# Patient Record
Sex: FEMALE | Race: AMERICAN INDIAN OR ALASKA NATIVE
[De-identification: names, ages, dates, MRNs, and addresses within clinical notes are randomized per-mention and may not be internally consistent; named-entity substitution may affect disease eponyms.]

---

## 2017-12-21 ENCOUNTER — HOSPITAL ENCOUNTER (EMERGENCY)
Dept: HOSPITAL 5 - ED | Age: 27
LOS: 1 days | Discharge: LEFT BEFORE DISCHARGE | End: 2017-12-22
Payer: COMMERCIAL
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
## 2017-12-21 DIAGNOSIS — N80.9: ICD-10-CM
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
## 2017-12-21 DIAGNOSIS — K05.10: ICD-10-CM

## 2017-12-21 DIAGNOSIS — S39.012A: Primary | ICD-10-CM

## 2017-12-21 DIAGNOSIS — N83.201: ICD-10-CM

## 2017-12-21 LAB
ALBUMIN SERPL-MCNC: 3.9 G/DL (ref 3.9–5)
ALBUMIN/GLOB SERPL: 0.9 %
ALP SERPL-CCNC: 65 UNITS/L (ref 35–129)
ALT SERPL-CCNC: 16 UNITS/L (ref 7–56)
ANION GAP SERPL CALC-SCNC: 15 MMOL/L
ANISOCYTOSIS BLD QL SMEAR: (no result)
BACTERIA #/AREA URNS HPF: (no result) /HPF
BILIRUB DIRECT SERPL-MCNC: < 0.2 MG/DL (ref 0–0.2)
BILIRUB INDIRECT SERPL-MCNC: 0 MG/DL
BILIRUB SERPL-MCNC: 0.2 MG/DL (ref 0.1–1.2)
BLASTOCYTES % (MANUAL): 0 %
BUN SERPL-MCNC: 8 MG/DL (ref 7–17)
BUN/CREAT SERPL: 16 %
CALCIUM SERPL-MCNC: 9.1 MG/DL (ref 8.4–10.2)
CHLORIDE SERPL-SCNC: 99.7 MMOL/L (ref 98–107)
CO2 SERPL-SCNC: 27 MMOL/L (ref 22–30)
GLUCOSE SERPL-MCNC: 72 MG/DL (ref 65–100)
HCT VFR BLD CALC: 39 % (ref 30.3–42.9)
HGB BLD-MCNC: 12.8 GM/DL (ref 10.1–14.3)
LIPASE SERPL-CCNC: 31 UNITS/L (ref 13–60)
MCH RBC QN AUTO: 30 PG (ref 28–32)
MCHC RBC AUTO-ENTMCNC: 33 % (ref 30–34)
MCV RBC AUTO: 90 FL (ref 79–97)
MUCOUS THREADS #/AREA URNS HPF: (no result) /HPF
PH UR STRIP: 6 [PH] (ref 5–7)
PLATELET # BLD: 260 K/MM3 (ref 140–440)
POIKILOCYTOSIS BLD QL SMEAR: (no result)
POTASSIUM SERPL-SCNC: 4.3 MMOL/L (ref 3.6–5)
PROT SERPL-MCNC: 8.3 G/DL (ref 6.3–8.2)
PROT UR STRIP-MCNC: (no result) MG/DL
RBC # BLD AUTO: 4.34 M/MM3 (ref 3.65–5.03)
RBC #/AREA URNS HPF: 1 /HPF (ref 0–6)
SODIUM SERPL-SCNC: 137 MMOL/L (ref 137–145)
TOTAL CELLS COUNTED PERCENT: 11
UROBILINOGEN UR-MCNC: < 2 MG/DL (ref ?–2)
WBC # BLD AUTO: 3.8 K/MM3 (ref 4.5–11)
WBC #/AREA URNS HPF: 7 /HPF (ref 0–6)

## 2017-12-21 PROCEDURE — 74177 CT ABD & PELVIS W/CONTRAST: CPT

## 2017-12-21 PROCEDURE — 81025 URINE PREGNANCY TEST: CPT

## 2017-12-21 PROCEDURE — 80048 BASIC METABOLIC PNL TOTAL CA: CPT

## 2017-12-21 PROCEDURE — 80074 ACUTE HEPATITIS PANEL: CPT

## 2017-12-21 PROCEDURE — 83690 ASSAY OF LIPASE: CPT

## 2017-12-21 PROCEDURE — 96361 HYDRATE IV INFUSION ADD-ON: CPT

## 2017-12-21 PROCEDURE — 81001 URINALYSIS AUTO W/SCOPE: CPT

## 2017-12-21 PROCEDURE — 36415 COLL VENOUS BLD VENIPUNCTURE: CPT

## 2017-12-21 PROCEDURE — 96374 THER/PROPH/DIAG INJ IV PUSH: CPT

## 2017-12-21 PROCEDURE — 85007 BL SMEAR W/DIFF WBC COUNT: CPT

## 2017-12-21 PROCEDURE — 76830 TRANSVAGINAL US NON-OB: CPT

## 2017-12-21 PROCEDURE — 99284 EMERGENCY DEPT VISIT MOD MDM: CPT

## 2017-12-21 PROCEDURE — 76856 US EXAM PELVIC COMPLETE: CPT

## 2017-12-21 PROCEDURE — 85025 COMPLETE CBC W/AUTO DIFF WBC: CPT

## 2017-12-21 PROCEDURE — 93975 VASCULAR STUDY: CPT

## 2017-12-21 NOTE — EMERGENCY DEPARTMENT REPORT
ED Abdominal Pain HPI





- General


Chief Complaint: Urogenital-Female


Stated Complaint: MOUTH/PELVIS/BACK PAIN


Time Seen by Provider: 12/21/17 20:12


Source: patient


Mode of arrival: Ambulatory


Limitations: No Limitations





- History of Present Illness


Initial Comments: 





This is a 27-year-old female nontoxic, well nourished in appearance, no acute 

signs of distress presents to the ED with c/o of abdominal pain, pelvic pain, 

back pain, and toothache.  Patient stated has been having a toothache x2 months 

but denies following up with a dentist. Denies any facial swelling, drooling, 

difficulty breathing, pus or drainage.  Patient describes lower pelvic/

abdominal and back pain as aching intermittently for 3 weeks.  Patient denies 

any fever, chills, nausea, vomiting, chest pain, short of breath, stiff neck, 

bladder or bowel stability, hematuria, dysuria, polyuria, vaginal bleeding, 

vaginal discharge, headache, numbness or tingling.  Patient denies any 

allergies.  Denies past medical history.


MD Complaint: abdominal pain


-: month(s)


Radiation: none


Migration to: no migration


Severity: mild


Severity scale (0 -10): 8


Quality: aching


Consistency: constant


Improves With: nothing


Worsens With: nothing


Associated Symptoms: denies other symptoms.  denies: nausea, vomiting, diarrhea

, fever, chills, constipation, dysuria, hematemesis, hematochezia, melena, 

hematuria, anorexia, syncope





- Related Data


 Previous Rx's











 Medication  Instructions  Recorded  Last Taken  Type


 


Amoxicillin/K Clav Tab [Augmentin 1 tab PO Q12HR #20 tab 12/21/17 Unknown Rx





875 mg]    


 


Chlorhexidine Mouthwash [Peridex] 15 ml MM BID 10 Days  bottle 12/21/17 Unknown 

Rx


 


Ibuprofen [Motrin] 600 mg PO Q8H PRN #30 tablet 12/21/17 Unknown Rx











 Allergies











Allergy/AdvReac Type Severity Reaction Status Date / Time


 


No Known Allergies Allergy   Verified 12/21/17 20:49














ED Review of Systems


ROS: 


Stated complaint: MOUTH/PELVIS/BACK PAIN


Other details as noted in HPI





Constitutional: denies: chills, fever


Eyes: denies: eye pain, eye discharge, vision change


ENT: dental pain.  denies: ear pain, throat pain


Respiratory: denies: cough, shortness of breath, wheezing


Cardiovascular: denies: chest pain, palpitations


Endocrine: no symptoms reported


Gastrointestinal: abdominal pain, other (pelvic pain).  denies: nausea, diarrhea


Genitourinary: denies: urgency, dysuria, discharge


Musculoskeletal: back pain.  denies: joint swelling, arthralgia


Skin: denies: rash, lesions


Neurological: denies: headache, weakness, paresthesias


Psychiatric: denies: anxiety, depression


Hematological/Lymphatic: denies: easy bleeding, easy bruising





ED Past Medical Hx





- Past Medical History


Previous Medical History?: No





- Surgical History


Past Surgical History?: No





- Social History


Smoking Status: Never Smoker


Substance Use Type: None





- Medications


Home Medications: 


 Home Medications











 Medication  Instructions  Recorded  Confirmed  Last Taken  Type


 


Amoxicillin/K Clav Tab [Augmentin 1 tab PO Q12HR #20 tab 12/21/17  Unknown Rx





875 mg]     


 


Chlorhexidine Mouthwash [Peridex] 15 ml MM BID 10 Days  bottle 12/21/17  

Unknown Rx


 


Ibuprofen [Motrin] 600 mg PO Q8H PRN #30 tablet 12/21/17  Unknown Rx














ED Physical Exam





- General


Limitations: No Limitations


General appearance: alert, in no apparent distress





- Head


Head exam: Present: atraumatic, normocephalic





- Eye


Eye exam: Present: normal appearance, PERRL, EOMI.  Absent: scleral icterus, 

conjunctival injection, nystagmus, periorbital swelling, periorbital tenderness


Pupils: Present: normal accommodation





- ENT


ENT exam: Present: mucous membranes moist, TM's normal bilaterally, normal 

external ear exam





- Expanded ENT Exam


  ** Expanded


Mouth exam: Present: normal external inspection, tongue normal.  Absent: 

drooling, trismus, muffled voice, tongue elevation, laceration


Teeth exam: Present: dental caries, fractured tooth # (17), dental tenderness # 

(17), gingival enlargement, other (No abscess or swelling noted)





  __________________________














  __________________________





 1 - Fractured, Dental Tenderness





Throat exam: Positive: normal inspection.  Negative: tonsillar erythema, 

tonsillomegaly, tonsillar exudate, R peritonsillar mass, L peritonsillar mass





- Neck


Neck exam: Present: normal inspection, full ROM.  Absent: tenderness, 

meningismus, lymphadenopathy, thyromegaly





- Respiratory


Respiratory exam: Present: normal lung sounds bilaterally.  Absent: respiratory 

distress, wheezes, rales, rhonchi, stridor, chest wall tenderness, accessory 

muscle use, decreased breath sounds, prolonged expiratory





- Cardiovascular


Cardiovascular Exam: Present: regular rate, normal rhythm, normal heart sounds.

  Absent: bradycardia, tachycardia, irregular rhythm, systolic murmur, 

diastolic murmur, rubs, gallop





- GI/Abdominal


GI/Abdominal exam: Present: soft, tenderness (pelvic pain), normal bowel 

sounds.  Absent: distended, guarding, rebound, rigid, diminished bowel sounds, 

organomegaly





- Expanded GI/Abdominal Exam


  ** Expanded


GI/Abdominal exam: Absent: psoas sign, obturator sign, heel tap sign, Galvin's 

sign, Rovsing's sign, tenderness at [place]'s [place], ascites





- Rectal


Rectal exam: Present: deferred





- Extremities Exam


Extremities exam: Present: normal inspection, full ROM, normal capillary 

refill.  Absent: tenderness, pedal edema, joint swelling, calf tenderness





- Back Exam


Back exam: Present: normal inspection, full ROM, paraspinal tenderness (lumbar 

region).  Absent: tenderness, CVA tenderness (R), CVA tenderness (L), muscle 

spasm, vertebral tenderness, rash noted





- Expanded Back Exam


  ** Expanded


Back exam: Present: normal rectal tone (as per patient).  Absent: saddle 

anesthesia


Back exam: Negative Straight Leg Raising: Left, Right





- Neurological Exam


Neurological exam: Present: alert, oriented X3, CN II-XII intact, normal gait, 

reflexes normal





- Psychiatric


Psychiatric exam: Present: normal affect, normal mood





- Skin


Skin exam: Present: warm, dry, intact, normal color.  Absent: rash





ED Course


 Vital Signs











  12/21/17 12/21/17





  14:16 20:38


 


Temperature 98.9 F 


 


Pulse Rate 64 


 


Respiratory  18





Rate  


 


Blood Pressure 134/56 


 


O2 Sat by Pulse 100 





Oximetry  














- Reevaluation(s)


Reevaluation #1: 





12/21/17 20:27


Patient is speaking in full sentences with no signs of distress noted.





- Consultations


Consultation #1: 





12/22/17 00:44


Dr. Valdivia has been consulted about patient history, physical exam, and imaging 

findings and agrees to the plan of care in the ED


Consultation #2: 





12/22/17 00:44


Dr. Gilmore from [facility] consulted about patient history, physical exam, and 

imaging findings and stated to get a doppler study of pelvis to rule out 

torsion and if normal to discharge with follow-up.





ED Medical Decision Making





- Lab Data


Result diagrams: 


 12/21/17 20:23





 12/21/17 20:23





- Medical Decision Making





This is a 27-year-old female that presents with abdominal/pelvic pain and 

dental caries.  Patient is stable and was examined by me.  Laboratory obtained 

within normal limits.  Vital signs stable.  CT of abdomen/pelvis with contrast 

has been obtained and the radiologist with possible gallbaldder and cystic 

lesion in the right adnexal region in right ovarian cyst. Dr. Valdivia consulted 

and agrees to plan of care in the ED and dischrage. US of pelvic and 

transvaginal obtained.  [facility] and stated if doppler study 

negative for ovarina torison patient can f/u outpatient. US of pelvis/

transvaginal doppler obtained with no ovrian torison present. Patient refused 

wet prep and gonorrhea chlamydia.  I instructed xuan the patient taht it is 

important that I examine patient for a possbile infection/STD but patient 

stated she refuses.  Patient signed AMA form but I will still treat patient for 

other conditions.  Patient is notified of x-ray results with her by the 

patient.  Patient received 1 L of normal saline and 30 mg IV Toradol for his 

symptoms has improved and has subsided.  Patient is discharged with Augmentin 

and Motrin due to dental caries and gingivitis.  Patient was instructed Follow-

up with a primary care doctor in 3-5 days or if symptoms worsen and continue 

return to emergency room as soon as possible.  At time time of signing AMA, the 

patient does not seem toxic or ill in appearance.  No acute signs of distress 

noted.  Patient agrees to discharge treatment plan of care.  No further 

questions noted by the patient.


Critical care attestation.: 


If time is entered above; I have spent that time in minutes in the direct care 

of this critically ill patient, excluding procedure time.








ED Disposition


Clinical Impression: 


 Pelvic pain, Dental caries, Gingivitis, Endometriosis





Abdominal pain


Qualifiers:


 Abdominal location: unspecified location Qualified Code(s): R10.9 - 

Unspecified abdominal pain





Low back strain


Qualifiers:


 Encounter type: initial encounter Qualified Code(s): S39.012A - Strain of 

muscle, fascia and tendon of lower back, initial encounter





Ovarian cyst


Qualifiers:


 Laterality: right Qualified Code(s): N83.201 - Unspecified ovarian cyst, right 

side





Disposition: DC-07 LEFT AGAINST MED ADVICE


Is pt being admited?: No


Does the pt Need Aspirin: No


Condition: Stable


Instructions:  Ibuprofen (By mouth), Amoxicillin/Clavulanate Potassium (By mouth

), Endometriosis (ED), Ovarian Cyst (ED)


Additional Instructions: 


Follow-up with a primary care doctor/GYN in 24 hours or if symptoms worsen and 

continue return to emergency room as soon as possible. 


Prescriptions: 


Amoxicillin/K Clav Tab [Augmentin 875 mg] 1 tab PO Q12HR #20 tab


Chlorhexidine Mouthwash [Peridex] 15 ml MM BID 10 Days  bottle


Ibuprofen [Motrin] 600 mg PO Q8H PRN #30 tablet


 PRN Reason: Pain


Referrals: 


[facility] [Outside] - 3-5 Days


[facility] [Outside] - 3-5 Days


[facility] [Provider Group] - 24 Hours


VISHNU GILMORE MD [Staff Physician] - 24 Hours


GABBY BROWN MD [Staff Physician] - 24 Hours


MARCUS COSME MD [Staff Physician] - 24 Hours


PRIMARY CARE,MD [Primary Care Provider] - 24 Hours


Forms:  Work/School Release Form(ED), AMA Form

## 2017-12-21 NOTE — CAT SCAN REPORT
FINAL REPORT



PROCEDURE:  CT ABDOMEN PELVIS W CON



TECHNIQUE:  Computerized axial tomography of the abdomen and

pelvis was performed after the IV injection of iodinated nonionic

contrast. 



HISTORY:  abd pain 



COMPARISON:  No prior studies are available for comparison.



FINDINGS:  

Visualized lower thorax: No significant abnormality.



Liver: Normal size and attenuation.



Spleen: Normal size and attenuation.



Gallbladder and biliary system: Multiple calculi are noted in the

gallbladder and gallbladder is contracted..



Pancreas: Normal.



Adrenals: Normal.



Kidneys: Normal.



GI tract: Normal.



Lymph nodes and mesentery: Normal. 



Vasculature: Normal.



Bladder: Normal.



Reproductive organs: There are 2 cystic lesions in the right

adnexal region larger measuring 3.7 by 3.4 centimeters and

smaller measuring 3.7 x 1.6 centimeters..



Peritoneum: No free fluid.



Musculoskeletal structures: No significant abnormality.



Other: None.  



IMPRESSION:  

Contracted gallbladder with the calculi most likely represents

the chronic cholecystitis. Ultrasound evaluation with 6 hours

fasting is recommended.



Cystic lesions in the right adnexal region most likely represent

right ovarian cysts. Ultrasound evaluation is recommended.

## 2017-12-22 VITALS — SYSTOLIC BLOOD PRESSURE: 130 MMHG | DIASTOLIC BLOOD PRESSURE: 61 MMHG

## 2017-12-22 NOTE — ULTRASOUND REPORT
FINAL REPORT



EXAM:  US PELVIC COMPLETE



HISTORY:  pelvic pain 



TECHNIQUE:  Transvaginal imaging was obtained of the pelvis along

with Doppler interrogation of the adnexa.



FINDINGS:  

The uterus is anteverted measuring 8.8 cm x 4.7 cm x 6.2 cm. The

myometrium is homogeneous. The endometrium is homogeneous and

measures 7.2 millimeters in thickness. There is a very small

amount of free fluid in the cul-de-sac.



The right ovary is enlarged measuring 4.8 cm x 4.7 cm x 6.1 cm.

There are multiple cysts in the right ovary the largest which is

complex with internal echoes measuring 3.9 cm x 3.1 cm x 3.9 cm.

Additional anechoic cysts are seen the right ovary measuring up

to 2.3 cm in diameter. The blood flow to the right ovary

otherwise is unremarkable. 



The left ovary measures 3.1 cm x 2.0 cm x 2.3 cm. The blood flow

is normal. There are several follicles in the left ovary the

largest measuring up to 1.4 cm in diameter. 







IMPRESSION:  

Bilateral ovarian cysts the largest which is complex with

internal echoes in the right ovary measuring 3.9 cm in diameter.

Follow-up pelvic sonogram is recommended after 2 menstrual cycles

to evaluate for interval change.



Very small amount of free fluid in the cul-de-sac.



Normal appearing uterus and endometrium.

## 2017-12-22 NOTE — ULTRASOUND REPORT
FINAL REPORT



EXAM:  US PELVIS DUPLEX DOPPLER COMP



HISTORY:  pelvic pain 



TECHNIQUE:  Repeat transvaginal imaging was obtained of the

pelvis along with Doppler interrogation of the adnexa. Comparison

is made to the previous study of 12/21/2017.



FINDINGS:  

The uterus is anteverted measuring 9.3 cm x 4.1 cm x 4.1 cm. The

endometrium is homogeneous measures 7.4 millimeters in thickness.

The myometrium is homogeneous. Free fluid is not seen.



The right ovary is enlarged measuring 6.1 cm x 3.6 cm x 5.3 cm.

There are multiple functional cysts in the right ovary the

largest measuring up to 3.9 cm in diameter containing internal

echoes. The blood flow is normal to the right ovary.



The left ovary measures 3.5 cm x 1.9 cm x 3.3 cm. There are

multiple benign-appearing follicles in left ovary measuring up to

1.5 cm in diameter. The blood flow is normal to the left ovary.



IMPRESSION:  

No evidence of ovarian torsion.



Bilateral ovarian cysts as described the largest in the right

ovary measuring 3.9 cm in diameter with internal echoes. Repeat

examination is recommended in 2 menstrual cycles to evaluate for

any interval changes.



Normal appearing uterus and endometrium.

## 2017-12-22 NOTE — ULTRASOUND REPORT
FINAL REPORT



PROCEDURE:  US TRANSVAGINAL



TECHNIQUE:  Real-time transvaginal sonography in multiple planes

of the pelvis was performed with image documentation. This

examination was performed without Doppler. Vascular

abnormalities, including ovarian torsion, will not be detectable

without Doppler evaluation. CPT 46785







HISTORY:  pelvic pain 



COMPARISON:  No prior studies are available for comparison.



FINDINGS:  

UTERUS



Size: 9 x 5 x 6 cm.



Endometrial thickness: 7 mm.



Orientation: anteverted.



Cervix: Normal.



Fibroids/masses: None.



RIGHT Ovary: 5 x 5 x 6 cm. 



Appearance: A complex cystic lesion with internal echoes is

identified measuring 3.9 x 3.1 centimeters.



LEFT Ovary: 3 x 2 x 2 cm.



Appearance: Normal.



Pelvic fluid: None.



Other: None.



IMPRESSION:  

A complex cystic lesion is noted in the right ovary corresponding

to the CT abnormality. This most likely represents a hemorrhagic

cyst such as seen in endometriosis. Clinical correlation is

recommended..

## 2018-01-27 ENCOUNTER — HOSPITAL ENCOUNTER (EMERGENCY)
Dept: HOSPITAL 5 - ED | Age: 28
Discharge: HOME | End: 2018-01-27
Payer: SELF-PAY

## 2018-01-27 VITALS — SYSTOLIC BLOOD PRESSURE: 120 MMHG | DIASTOLIC BLOOD PRESSURE: 72 MMHG

## 2018-01-27 DIAGNOSIS — F17.200: ICD-10-CM

## 2018-01-27 DIAGNOSIS — K08.89: Primary | ICD-10-CM

## 2018-01-27 DIAGNOSIS — H92.09: ICD-10-CM

## 2018-01-27 PROCEDURE — 99282 EMERGENCY DEPT VISIT SF MDM: CPT

## 2018-01-27 NOTE — EMERGENCY DEPARTMENT REPORT
ED ENT HPI





- General


Chief complaint: Dental/Oral


Stated complaint: TOOTHACHE


Time Seen by Provider: 01/27/18 17:28


Source: patient (Toothache and ear pain on and off for over a month. She 

visited the ER last month and was prescribed AB, mouth wash, and Motrin which 

helped a little but she could not go see a dentist because of lack of 

insurance. Associated with hearing impairment and heaviness in the ear. )


Mode of arrival: Ambulatory


Limitations: No Limitations





- Related Data


 Previous Rx's











 Medication  Instructions  Recorded  Last Taken  Type


 


Amoxicillin/K Clav Tab [Augmentin 1 tab PO Q12HR #20 tab 12/21/17 Unknown Rx





875 mg]    


 


Chlorhexidine Mouthwash [Peridex] 15 ml MM BID 10 Days  bottle 01/27/18 Unknown 

Rx


 


Ciprofloxacin HCl [Cipro] 500 mg PO BID 10 Days  tablet 01/27/18 Unknown Rx


 


Ibuprofen [Motrin 600 MG tab] 600 mg PO Q8H PRN #30 tablet 01/27/18 Unknown Rx











 Allergies











Allergy/AdvReac Type Severity Reaction Status Date / Time


 


No Known Allergies Allergy   Verified 12/21/17 20:49














ED Dental HPI





- General


Chief complaint: Dental/Oral


Stated complaint: TOOTHACHE


Time Seen by Provider: 01/27/18 17:28


Source: patient


Mode of arrival: Ambulatory


Limitations: No Limitations





- Related Data


 Previous Rx's











 Medication  Instructions  Recorded  Last Taken  Type


 


Amoxicillin/K Clav Tab [Augmentin 1 tab PO Q12HR #20 tab 12/21/17 Unknown Rx





875 mg]    


 


Chlorhexidine Mouthwash [Peridex] 15 ml MM BID 10 Days  bottle 01/27/18 Unknown 

Rx


 


Ciprofloxacin HCl [Cipro] 500 mg PO BID 10 Days  tablet 01/27/18 Unknown Rx


 


Ibuprofen [Motrin 600 MG tab] 600 mg PO Q8H PRN #30 tablet 01/27/18 Unknown Rx











 Allergies











Allergy/AdvReac Type Severity Reaction Status Date / Time


 


No Known Allergies Allergy   Verified 12/21/17 20:49














ED Review of Systems


ROS: 


Stated complaint: TOOTHACHE


Other details as noted in HPI





Constitutional: denies: chills, fever


Eyes: denies: eye pain, eye discharge, vision change


ENT: ear pain, congestion.  denies: throat pain


Respiratory: denies: cough, shortness of breath, wheezing


Cardiovascular: denies: chest pain, palpitations


Endocrine: no symptoms reported


Gastrointestinal: denies: abdominal pain, nausea, diarrhea


Genitourinary: denies: urgency, dysuria, discharge


Musculoskeletal: denies: back pain, joint swelling, arthralgia


Skin: denies: rash, lesions


Neurological: denies: headache, weakness, paresthesias


Psychiatric: denies: anxiety, depression


Hematological/Lymphatic: denies: easy bleeding, easy bruising





ED Past Medical Hx





- Past Medical History


Previous Medical History?: No





- Surgical History


Past Surgical History?: No





- Social History


Smoking Status: Current Every Day Smoker


Substance Use Type: None





- Medications


Home Medications: 


 Home Medications











 Medication  Instructions  Recorded  Confirmed  Last Taken  Type


 


Amoxicillin/K Clav Tab [Augmentin 1 tab PO Q12HR #20 tab 12/21/17  Unknown Rx





875 mg]     


 


Chlorhexidine Mouthwash [Peridex] 15 ml MM BID 10 Days  bottle 01/27/18  

Unknown Rx


 


Ciprofloxacin HCl [Cipro] 500 mg PO BID 10 Days  tablet 01/27/18  Unknown Rx


 


Ibuprofen [Motrin 600 MG tab] 600 mg PO Q8H PRN #30 tablet 01/27/18  Unknown Rx














ED Physical Exam





- General


Limitations: No Limitations


General appearance: alert, in no apparent distress





- Head


Head exam: Present: atraumatic, normocephalic





- Eye


Eye exam: Present: normal appearance





- ENT


ENT exam: Present: normal orophraynx (except tender lower right premolar and a 

cavity of lower left 3rd molar. ), mucous membranes moist, TM's normal 

bilaterally





- Neck


Neck exam: Present: normal inspection





- Respiratory


Respiratory exam: Present: normal lung sounds bilaterally.  Absent: respiratory 

distress





- Cardiovascular


Cardiovascular Exam: Present: regular rate, normal rhythm.  Absent: systolic 

murmur, diastolic murmur, rubs, gallop





- GI/Abdominal


GI/Abdominal exam: Present: soft, normal bowel sounds





- Extremities Exam


Extremities exam: Present: normal inspection





- Back Exam


Back exam: Present: normal inspection





- Neurological Exam


Neurological exam: Present: alert, oriented X3





- Psychiatric


Psychiatric exam: Present: normal affect, normal mood





- Skin


Skin exam: Present: warm, dry, intact, normal color.  Absent: rash





ED Course





 Vital Signs











  01/27/18





  11:51


 


Temperature 97.8 F


 


Pulse Rate 80


 


Respiratory 16





Rate 


 


Blood Pressure 118/79


 


O2 Sat by Pulse 100





Oximetry 











Critical care attestation.: 


If time is entered above; I have spent that time in minutes in the direct care 

of this critically ill patient, excluding procedure time.








ED Disposition


Clinical Impression: 


 Toothache





Ear pain


Qualifiers:


 Laterality: unspecified laterality Qualified Code(s): H92.09 - Otalgia, 

unspecified ear





Disposition: DC-01 TO HOME OR SELFCARE


Is pt being admited?: No


Does the pt Need Aspirin: No


Condition: Stable


Prescriptions: 


Chlorhexidine Mouthwash [Peridex] 15 ml MM BID 10 Days  bottle


Ciprofloxacin HCl [Cipro] 500 mg PO BID 10 Days  tablet


Ibuprofen [Motrin 600 MG tab] 600 mg PO Q8H PRN #30 tablet


 PRN Reason: Pain


Referrals: 


[facility] [Outside] - 3-5 Days